# Patient Record
Sex: FEMALE | URBAN - METROPOLITAN AREA
[De-identification: names, ages, dates, MRNs, and addresses within clinical notes are randomized per-mention and may not be internally consistent; named-entity substitution may affect disease eponyms.]

---

## 2018-08-08 ENCOUNTER — HOSPITAL ENCOUNTER (OUTPATIENT)
Dept: OTHER | Age: 78
Discharge: HOME | End: 2018-08-31
Attending: PHYSICIAN ASSISTANT | Admitting: PHYSICIAN ASSISTANT

## 2018-08-08 ASSESSMENT — PAIN DESCRIPTION - LOCATION: LOCATION: KNEE

## 2018-08-08 ASSESSMENT — PAIN DESCRIPTION - PAIN TYPE: TYPE: CHRONIC PAIN

## 2018-08-08 ASSESSMENT — PAIN SCALES - GENERAL: PAINLEVEL_OUTOF10: 8

## 2018-08-08 ASSESSMENT — PAIN DESCRIPTION - DESCRIPTORS: DESCRIPTORS: SHARP

## 2018-08-08 ASSESSMENT — PAIN DESCRIPTION - ORIENTATION: ORIENTATION: RIGHT;LEFT;ANTERIOR

## 2018-08-08 ASSESSMENT — PAIN DESCRIPTION - ONSET: ONSET: GRADUAL

## 2018-08-08 ASSESSMENT — PAIN DESCRIPTION - PROGRESSION: CLINICAL_PROGRESSION: GRADUALLY WORSENING

## 2018-08-08 ASSESSMENT — PAIN DESCRIPTION - FREQUENCY: FREQUENCY: CONTINUOUS

## 2018-08-08 NOTE — FLOWSHEET NOTE
with other providers:  POC sent 8/8/18    Education provided to patient/caregiver:  HEP and frequency     Adverse reactions to treatment:  --    Equipment provided:  YTB    Assessment:  Pt is 78year old female with 5 month gradual increase in chronic onset of knee/ankle pain. Pt now has difficulties completing all weightbearing activities including gait and transfers. Pt demo deficits this date that include significant B LE weakness and inability to complete any standing or transfers requiring weightbearing secondary to pain and weakness. Pt will benefit with PT services with progression of strength/ROM, manual and modalities to return to PLOF. Pt prior to onset of current condition had min pain and able to ambulate short distances. Patient agrees with established plan of care and assisted in the development of their short term and long term goals. Patient had no adverse reaction with initial treatment and there are no barriers to learning. Demonstrates no mental or cognitive disorder. Time In / Time Out:   1730/1835                Timed Code/Total Treatment Minutes:  25/65'    25' TE, 1 PT eval     Patients Report of Tolerance:    [x] Patient limited by fatigue        [] Patient limited by pain   [] Patient limited by other medical complications   [] Other:     Prognosis:   [] Good [x] Fair  [] Poor    Plan:   [] Continue per plan of care [] Alter current plan (see comments)  [x] Plan of care initiated [] Hold pending MD visit [] Discharge    Plan for Next Session:  REview HEP, nu-step, open chain strengthening as tolerated, manual stretching of hips/knees, standing to tolerance, shuttle WB onto LE.  Ice/heat for pain        Next Progress Note due:  Eval 8/8/18   Visit 10           Electronically signed by:  Pavithra Sunshine PT  8/8/2018, 6:47 PM        8/8/2018 6:47 PM

## 2018-08-08 NOTE — PROGRESS NOTES
Physical Therapy  Initial Assessment  Date: 2018  Patient Name: Ventura Aaron  MRN: [id number]  : 1940     Treatment Diagnosis: Significant LE weakness, B knee pain, deconditioning     Restrictions       Subjective   General  Chart Reviewed: Yes  Patient assessed for rehabilitation services?: Yes  Referring Practitioner: Paige BEARD   Diagnosis: B knee pain   Subjective  Subjective: Has polymyalgia rheumatica discovered 2 weeks ago but chronic knee pain for years. 5 months ago with kidney complication, no standing/walking since. Was walking with walker with wheels 50ft. prior to 5 months. Pain in the knees and ankle limiting primarily with ambulation. Pain Screening  Patient Currently in Pain: Yes  Pain Assessment  Pain Assessment: 0-10  Pain Level: 8 (Best: 5-6/10    Worst: 10/10)  Pain Type: Chronic pain  Pain Location: Knee  Pain Orientation: Right;Left; Anterior  Pain Descriptors: Sharp  Pain Frequency: Continuous  Pain Onset: Gradual  Clinical Progression: Gradually worsening  Effect of Pain on Daily Activities: All weightbearing activities   Patient's Stated Pain Goal: 3  Pain Intervention(s): Medication (see eMar)  Vital Signs  Patient Currently in Pain: Yes    Vision/Hearing  Vision  Vision: Within Functional Limits  Hearing  Hearing: Within functional limits    Orientation  Orientation  Overall Orientation Status: Within Normal Limits    Social/Functional History  Social/Functional History  Type of Home: House  Home Layout: One level  Home Equipment: Rolling walker  Receives Help From: Family  ADL Assistance: Needs assistance  Homemaking Assistance: Needs assistance  Ambulation Assistance: Needs assistance  Transfer Assistance: Needs assistance  Active : No     Objective     Observation/Palpation  Palpation: Min increase in pain with palpation to the knees. Observation: Entered with chair. Dependent with transfers from wheelchair and mod assist with standing.  R ankle INV in standing with weightbearing. PROM RLE (degrees)  RLE PROM: WFL  RLE General PROM: Min flexibility restriction into hips, demo full knee PROM  AROM RLE (degrees)  RLE General AROM: Global limitation in R >L with all AROM, inability to complete full Long Arc quad and min applied resistance to therapist in all directions targeting hips/knees and ankle. PROM LLE (degrees)  LLE PROM: WFL  LLE General PROM: Min flexibility restriction into hips, demo full knee PROM   AROM LLE (degrees)  LLE General AROM: Global limitation in R >L with all AROM, inability to complete full Long Arc quad and min applied resistance to therapist in all directions targeting hips/knees and ankle. Strength RLE  Comment: <3-/5 in all directions with greater difficulty on R compared to L. Denies increase in pain with min AROM with commands. Requires external assistnace to reach end ranges. Requires assistance with transfers including rolling and bridge. Strength LLE  Comment: <3-/5 in all directions with greater difficulty on R compared to L. Denies increase in pain with min AROM with commands. Strength Other  Other: Unable to complete bridge without mod assistance. Sensation  Overall Sensation Status: WFL  Bed mobility  Bridging: Moderate assistance  Rolling to Left: Minimal assistance  Rolling to Right: Minimal assistance  Supine to Sit: Moderate assistance  Sit to Supine: Moderate assistance  Transfers  Sit to Stand: Maximum Assistance  Stand to sit: Maximum Assistance  Bed to Chair: Maximum assistance  Stand Pivot Transfers: Dependent/Total  Ambulation  Ambulation?: No  Wheelchair Activities  Wheelchair Type: Standard  Balance  Comments: Unable to stand with UE assistance. Assessment   Conditions Requiring Skilled Therapeutic Intervention  Body structures, Functions, Activity limitations: Decreased functional mobility ; Decreased ROM; Decreased strength;Decreased endurance;Decreased balance  Pt is 78year old female with 5 month gradual increase in chronic onset of knee/ankle pain. Pt now has difficulties completing all weightbearing activities including gait and transfers. Pt demo deficits this date that include significant B LE weakness and inability to complete any standing or transfers requiring weightbearing secondary to pain and weakness. Pt will benefit with PT services with progression of strength/ROM, manual and modalities to return to PLOF. Pt prior to onset of current condition had min pain and able to ambulate short distances. Patient agrees with established plan of care and assisted in the development of their short term and long term goals. Patient had no adverse reaction with initial treatment and there are no barriers to learning. Demonstrates no mental or cognitive disorder. Treatment Diagnosis: Significant LE weakness, B knee pain, deconditioning   Prognosis: Fair  Decision Making: High Complexity  REQUIRES PT FOLLOW UP: Yes  Activity Tolerance  Activity Tolerance: Patient limited by fatigue         Plan   Plan  Times per week: 1-2  Plan weeks: 6  Specific instructions for Next Treatment: REview HEP, nu-step, open chain strengthening as tolerated, manual stretching of hips/knees, standing to tolerance, shuttle WB onto LE. Ice/heat for pain  Current Treatment Recommendations: Strengthening, ROM, Neuromuscular Re-education, Home Exercise Program, Functional Mobility Training, Gait Training, Manual Therapy - Soft Tissue Mobilization, Modalities, Transfer Training, Balance Training    G-Code  PT G-Codes  Functional Assessment Tool Used: KOOS  Score: 100% disability   Functional Limitation: Mobility: Walking and moving around  Mobility: Walking and Moving Around Current Status (): 100 percent impaired, limited or restricted  Mobility: Walking and Moving Around Goal Status ():  At least 60 percent but less than 80 percent impaired, limited or restricted      Goals  Long term goals  Time Frame for Long term goals : 6 weeks 9/22/18  Long term goal 1: Pt will demo I with HEP/symptom management. Long term goal 2: Pt will demo standing with walker with min assist >1 min. Long term goal 3: Pt will demo >3+/5 B LE strength to ease transfers. Long term goal 4: Pt will demo stand pivot transfer with min assist.   Long term goal 5: Pt will demo <min assist with bed mobility activities. Patient Goals   Patient goals : return to walking with walker.         Ruddy Mac, PT, OCS    8/8/2018 6:42 PM

## 2018-08-08 NOTE — PLAN OF CARE
Outpatient Physical Therapy           [place]           [] Phone: [phone]   Fax: [phone]  [facility]           [] Phone: [phone]   Fax: [phone]     To: Referring Practitioner: Cheryl BEARD     From: Phill Flores, PT     Patient: Rocio Bejarano         : 1940  Diagnosis: Diagnosis: B knee pain    Treatment Diagnosis: Treatment Diagnosis: Significant LE weakness, B knee pain, deconditioning    Date: 2018    Physical Therapy Certification/Re-Certification Form  Dear Cheryl BEARD  The following patient has been evaluated for physical therapy services and for therapy to continue, insurance requires physician review of the treatment plan initially and every 90 days. Please review the attached evaluation and/or summary of the patient's plan of care, and verify that you agree therapy should continue by signing the attached document and sending it back to our office. Assessment: Pt is 78year old female with 5 month gradual increase in chronic onset of knee/ankle pain. Pt now has difficulties completing all weightbearing activities including gait and transfers. Pt demo deficits this date that include significant B LE weakness and inability to complete any standing or transfers requiring weightbearing secondary to pain and weakness. Pt will benefit with PT services with progression of strength/ROM, manual and modalities to return to [facility]. Pt prior to onset of current condition had min pain and able to ambulate short distances. Patient agrees with established plan of care and assisted in the development of their short term and long term goals. Patient had no adverse reaction with initial treatment and there are no barriers to learning. Demonstrates no mental or cognitive disorder.        Plan of Care/Treatment to date:  [x] Therapeutic Exercise  [x] Modalities:  [x] Therapeutic Activity     [] Ultrasound  [] Electrical Stimulation  [x] Gait Training      [] Cervical Traction [] Lumbar Traction  [x] Neuromuscular Re-education    [x] Cold/hotpack [] Iontophoresis   [x] Instruction in HEP      [] Vasopneumatic     [x] Manual Therapy               [] Aquatic Therapy       Other:    ? Frequency/Duration:  # Days per week: [x] 1 day # Weeks: [] 1 week [] 5 weeks     [x] 2 days? [] 2 weeks [x] 6 weeks     [] 3 days   [] 3 weeks [] 7 weeks     [] 4 days   [] 4 weeks [] 8 weeks         [] 9 weeks [] 10 weeks         [] 11 weeks [] 12 weeks    Rehab Potential/Progress: [] Excellent [] Good [x] Fair  [] Poor     Goals:         Long term goals  Time Frame for Long term goals : 6 weeks 9/22/18  Long term goal 1: Pt will demo I with HEP/symptom management. Long term goal 2: Pt will demo standing with walker with min assist >1 min. Long term goal 3: Pt will demo >3+/5 B LE strength to ease transfers. Long term goal 4: Pt will demo stand pivot transfer with min assist.   Long term goal 5: Pt will demo <min assist with bed mobility activities.      G-Code Selection: (On Eval and every 10th visit or Discharge)  MEASURE  [x] Mobility: Walking and Moving Around     [x] Current ()   [x] Goal ()   [] DC ()  [] Changing/Maintaining Body Position     [] Current (2704)      [] Goal ()   [] DC ()  [] Carrying / Moving / Handling Objects     [] Current ()   [] Goal ()   [] DC ()  [] Self-Care     [] Current ()   [] Goal ()   [] DC ()  [] Other PT/OT primary DX     [] Current ()   [] Goal ()   [] DC ()    SEVERITY  CURRENT  GOAL  DISCHARGE   [] CH (0% Impaired, Indep.)  [] CI (1-19% Impaired, SBA-CGA)  [] CJ (20-39% Impaired, MIN A)  [] CK  (40-59% Impairment, Mod A)  [] CL  (60-79% Impairment, Max A)  [] CM  (80-99% Impairment, Dep.)   [x] CN  (100% Impairment, Tot Dep.) [] CH (0% Impaired, Indep.)  [] CI (1-19% Impaired, SBA-CGA)  [] CJ (20-39% Impaired, MIN A)  [] CK  (40-59% Impairment, Mod A)  [x] CL  (60-79% Impairment, Max A)  [] CM  (80-99% Impairment, Dep.)   [] CN  (100% Impairment, Tot Dep.)  [] CH (0% Impaired, Indep.)  [] CI (1-19% Impaired, SBA-CGA)  [] CJ (20-39% Impaired, MIN A)  [] CK  (40-59% Impairment, Mod A)  [] CL  (60-79% Impairment, Max A)  [] CM  (80-99% Impairment, Dep.)   [] CN  (100% Impairment, Tot Dep.)          Electronically signed by:  Nidia Herrera PT, 8/8/2018, 6:45 PM      8/8/2018 6:45 PM       If you have any questions or concerns, please don't hesitate to call.   Thank you for your referral.      Physician Signature:________________________________Date:_________ TIME: _____  By signing above, therapists plan is approved by physician

## 2018-09-01 ENCOUNTER — HOSPITAL ENCOUNTER (OUTPATIENT)
Dept: OTHER | Age: 78
Discharge: HOME | End: 2018-09-01
Attending: PHYSICIAN ASSISTANT | Admitting: PHYSICIAN ASSISTANT

## 2020-12-21 ENCOUNTER — HOSPITAL ENCOUNTER (OUTPATIENT)
Age: 80
Setting detail: SPECIMEN
Discharge: HOME | End: 2020-12-21

## 2020-12-21 LAB
INR BLD: 6.2
PROTHROMBIN TIME: 60.6 SEC (ref 9–12)

## 2020-12-24 ENCOUNTER — HOSPITAL ENCOUNTER (OUTPATIENT)
Age: 80
Setting detail: SPECIMEN
Discharge: HOME | End: 2020-12-24

## 2020-12-24 LAB
INR BLD: 6.9
PROTHROMBIN TIME: 67.3 SEC (ref 9–12)

## 2020-12-30 ENCOUNTER — HOSPITAL ENCOUNTER (OUTPATIENT)
Age: 80
Setting detail: SPECIMEN
Discharge: HOME | End: 2020-12-30

## 2020-12-31 LAB
INR BLD: 2
PROTHROMBIN TIME: 20.6 SEC (ref 9–12)

## 2021-01-07 ENCOUNTER — HOSPITAL ENCOUNTER (OUTPATIENT)
Age: 81
Setting detail: SPECIMEN
Discharge: HOME | End: 2021-01-07

## 2021-01-07 LAB
INR BLD: 7.1
PROTHROMBIN TIME: 69.1 SEC (ref 9–12)

## 2021-01-11 ENCOUNTER — HOSPITAL ENCOUNTER (OUTPATIENT)
Age: 81
Setting detail: SPECIMEN
Discharge: HOME | End: 2021-01-11

## 2021-01-12 LAB
INR BLD: 6
PROTHROMBIN TIME: 58.7 SEC (ref 9–12)

## 2021-01-14 ENCOUNTER — HOSPITAL ENCOUNTER (OUTPATIENT)
Age: 81
Setting detail: SPECIMEN
Discharge: HOME | End: 2021-01-14

## 2021-01-14 LAB
INR BLD: 2.4
PROTHROMBIN TIME: 24 SEC (ref 9–12)

## 2021-01-25 ENCOUNTER — HOSPITAL ENCOUNTER (OUTPATIENT)
Age: 81
Setting detail: SPECIMEN
Discharge: HOME | End: 2021-01-25

## 2021-01-25 LAB
INR BLD: 1.2
PROTHROMBIN TIME: 12.4 SEC (ref 9–12)

## 2021-02-03 ENCOUNTER — HOSPITAL ENCOUNTER (OUTPATIENT)
Age: 81
Setting detail: SPECIMEN
Discharge: HOME | End: 2021-02-03

## 2021-02-04 LAB
INR BLD: 2.5
PROTHROMBIN TIME: 24.9 SEC (ref 9–12)

## 2021-02-09 ENCOUNTER — HOSPITAL ENCOUNTER (OUTPATIENT)
Age: 81
Setting detail: SPECIMEN
Discharge: HOME | End: 2021-02-09

## 2021-02-10 LAB
ANION GAP SERPL CALCULATED.3IONS-SCNC: 10 MMOL/L (ref 9–17)
BUN BLDV-MCNC: 12 MG/DL (ref 8–23)
BUN/CREAT BLD: ABNORMAL (ref 9–20)
CALCIUM SERPL-MCNC: 8.8 MG/DL (ref 8.6–10.4)
CHLORIDE BLD-SCNC: 96 MMOL/L (ref 98–107)
CO2: 26 MMOL/L (ref 20–31)
CREAT SERPL-MCNC: 0.58 MG/DL (ref 0.5–0.9)
GFR AFRICAN AMERICAN: >60 ML/MIN
GFR NON-AFRICAN AMERICAN: >60 ML/MIN
GFR SERPL CREATININE-BSD FRML MDRD: ABNORMAL ML/MIN/{1.73_M2}
GFR SERPL CREATININE-BSD FRML MDRD: ABNORMAL ML/MIN/{1.73_M2}
GLUCOSE BLD-MCNC: 159 MG/DL (ref 70–99)
INR BLD: 3.3
POTASSIUM SERPL-SCNC: 4.3 MMOL/L (ref 3.7–5.3)
PROTHROMBIN TIME: 33 SEC (ref 9–12)
SODIUM BLD-SCNC: 132 MMOL/L (ref 135–144)

## 2021-02-17 ENCOUNTER — HOSPITAL ENCOUNTER (OUTPATIENT)
Age: 81
Setting detail: SPECIMEN
Discharge: HOME | End: 2021-02-17

## 2021-02-18 LAB
INR BLD: 1.6
PROTHROMBIN TIME: 16.4 SEC (ref 9.1–12.3)

## 2021-03-01 ENCOUNTER — HOSPITAL ENCOUNTER (OUTPATIENT)
Age: 81
Setting detail: SPECIMEN
Discharge: HOME | End: 2021-03-01

## 2021-03-02 LAB
INR BLD: 1.6
PROTHROMBIN TIME: 16.7 SEC (ref 9.1–12.3)